# Patient Record
Sex: FEMALE | Race: WHITE | NOT HISPANIC OR LATINO | URBAN - NONMETROPOLITAN AREA
[De-identification: names, ages, dates, MRNs, and addresses within clinical notes are randomized per-mention and may not be internally consistent; named-entity substitution may affect disease eponyms.]

---

## 2017-10-26 PROBLEM — H52.4: Noted: 2017-10-26

## 2017-10-26 PROBLEM — H52.03: Noted: 2017-10-26

## 2017-10-26 PROBLEM — H25.13: Noted: 2021-01-04

## 2017-10-26 PROBLEM — H52.223: Noted: 2017-10-26

## 2021-01-04 ENCOUNTER — IMPORTED ENCOUNTER (OUTPATIENT)
Dept: URBAN - NONMETROPOLITAN AREA CLINIC 1 | Facility: CLINIC | Age: 66
End: 2021-01-04

## 2021-01-04 PROCEDURE — S0621 ROUTINE OPHTHALMOLOGICAL EXA: HCPCS

## 2021-01-04 NOTE — PATIENT DISCUSSION
Compound Hyperopic Astigmatsim OU w/ Presbyopia-  discussed findings w/ patient-  new spectacle Rx issued today-  monitor yearly or prn Mild Cataracts OU-  discussed findings w/patient-  no treatment indicated-  UV protection recommended-  RTC 1 year or prn; 's Notes: MR 1/4/2021DFE 1/4/2021

## 2021-07-02 ENCOUNTER — IMPORTED ENCOUNTER (OUTPATIENT)
Dept: URBAN - NONMETROPOLITAN AREA CLINIC 1 | Facility: CLINIC | Age: 66
End: 2021-07-02

## 2021-07-02 PROBLEM — H25.13: Noted: 2021-01-04

## 2021-07-02 PROBLEM — H52.03: Noted: 2017-10-26

## 2021-07-02 PROBLEM — H52.223: Noted: 2017-10-26

## 2021-07-02 PROBLEM — H52.4: Noted: 2017-10-26

## 2021-07-02 PROBLEM — H16.223: Noted: 2021-07-02

## 2021-07-02 PROCEDURE — 92014 COMPRE OPH EXAM EST PT 1/>: CPT

## 2021-07-02 PROCEDURE — 92015 DETERMINE REFRACTIVE STATE: CPT

## 2021-07-02 NOTE — PATIENT DISCUSSION
Compound Hyperopic Astigmatsim OU w/ Presbyopia-  discussed findings w/ patient-  new spectacle Rx issued today-  monitor yearly or prn Mild Cataracts OU-  discussed findings w/patient-  no treatment indicated-  UV protection recommended-  RTC 1 year or prnDES OU-  discussed findings w/patient-  patient asymptomatic at this time-  continue AT's PRN OU-  RTC 1 year or prn; Dr's Notes: DR. Odilon Min-[facility] 7/2/2021DFE 1/4/2021

## 2021-07-14 ENCOUNTER — IMPORTED ENCOUNTER (OUTPATIENT)
Dept: URBAN - NONMETROPOLITAN AREA CLINIC 1 | Facility: CLINIC | Age: 66
End: 2021-07-14

## 2021-07-14 PROBLEM — H52.223: Noted: 2021-07-14

## 2021-07-14 PROBLEM — H16.223: Noted: 2021-07-14

## 2021-07-14 PROBLEM — H52.03: Noted: 2021-07-14

## 2021-07-14 PROBLEM — H52.4: Noted: 2021-07-14

## 2021-07-14 PROBLEM — H25.13: Noted: 2021-07-14

## 2021-07-14 PROCEDURE — 99213 OFFICE O/P EST LOW 20 MIN: CPT

## 2021-07-14 NOTE — PATIENT DISCUSSION
STEPHANIE CRISTINA-  discussed findings w/patient-  patient feels much improvement from last visit-  taper PF 1% to BID OU x 2 weeks then d/c-  continue AT's at least BID OU-  RTC 1 year or prn; 's Notes: DR. Eusebio Min-[facility] 7/14/2021DFE 1/4/2021

## 2022-04-09 ASSESSMENT — TONOMETRY
OD_IOP_MMHG: 16
OS_IOP_MMHG: 17
OD_IOP_MMHG: 15
OS_IOP_MMHG: 15
OS_IOP_MMHG: 16
OD_IOP_MMHG: 15

## 2022-04-09 ASSESSMENT — VISUAL ACUITY
OU_SC: 20/25-2
OD_SC: 20/25
OD_CC: 20/30
OS_PH: 20/30
OS_CC: 20/40+2
OD_CC: 20/40-
OS_PH: 20/30
OU_CC: 20/30
OU_SC: 20/400
OD_SC: 20/30-1
OD_PH: 20/30+2
OS_CC: 20/30
OS_SC: 20/40-2
OS_SC: 20/50+2

## 2024-10-22 ENCOUNTER — COMPREHENSIVE EXAM (OUTPATIENT)
Dept: URBAN - NONMETROPOLITAN AREA CLINIC 4 | Facility: CLINIC | Age: 69
End: 2024-10-22

## 2024-10-22 DIAGNOSIS — H25.13: ICD-10-CM

## 2024-10-22 DIAGNOSIS — H52.4: ICD-10-CM

## 2024-10-22 DIAGNOSIS — H16.223: ICD-10-CM

## 2024-10-22 DIAGNOSIS — H52.03: ICD-10-CM

## 2024-10-22 DIAGNOSIS — H52.223: ICD-10-CM

## 2024-10-22 PROCEDURE — 92015 DETERMINE REFRACTIVE STATE: CPT

## 2024-10-22 PROCEDURE — 92014 COMPRE OPH EXAM EST PT 1/>: CPT
